# Patient Record
(demographics unavailable — no encounter records)

---

## 2025-02-28 NOTE — PHYSICAL EXAM
[de-identified] : L spine   No rashes, erythema, edema noted TTP right lumbar paraspinals and sciatic notch Positive straight leg raise on the right LLE: 5/5 strength RLE: 5/5 strength Sensation decreased along R L5 dermatome

## 2025-02-28 NOTE — DISCUSSION/SUMMARY
[de-identified] : A discussion regarding available pain management treatment options occurred with the patient. These included interventional, rehabilitative, pharmacological, and alternative modalities. We will proceed with the following:     Imagin. MRI lumbar spine w/o contrast to evaluate for anatomic changes of the lumbar discs, nerves, and surrounding tissue that will help provide information to accurately diagnose the patient's cause of pain and therefore treat said pain generator in the most effective way possible - whether that be specific physical therapy recommendations, medications, and/or interventional therapies.   Medications: 1. Ordered Ibuprofen 800 mg q12h prn (30-day supply, 60 tablets) 2. Ordered Methylprednisolone 21-tablet, 6-day taper pack (30-day supply, 60 tablets)  I advised Ms. Ferris that the NSAID should be taken with food.  In addition, while taking the prescribed NSAID, no over the counter or other NSAIDs should be used, such as ibuprofen (Motrin or Advil) or naproxen (Aleve) as this can cause stomach upset or other side effects.  If needed for fever or breakthrough pain Tylenol can be used.  I advised Ms. Ferris that the steroid should be taken with food.  In addition, while taking the prescribed steroid, no over the counter or other NSAIDs should be used, such as ibuprofen (Motrin or Advil) or naproxen (Aleve) as this can cause stomach upset or other side effects.  If needed for fever or breakthrough pain Tylenol can be used.    Complementary treatment options: - lifestyle modifications discussed - avoid bending and bend with knees - avoid heavy lifting   - Follow up in 2-3 weeks to discuss imaging.   I Johanne Horton attest that this documentation has been prepared under the direction and in the presence of provider Dr. Aniceto Aguiar.  The documentation recorded by the scribe in my presence, accurately reflects the service I personally performed, and the decisions made by me with my edits as appropriate.   Aniceto Aguiar, DO

## 2025-02-28 NOTE — HISTORY OF PRESENT ILLNESS
Detail Level: Zone
[FreeTextEntry1] : Ms. Ferris is a 37-year-old female presenting to establish care for her lower back pain. She has a chronic history of lower back pain which she has undergone a microdiscectomy for. About 1 month after surgery, she ended up re-herniating the disc that she had surgical correction on. Last week, she started to experience similar symptoms. She is a  and is working a lot. Her pain is mainly on the right side of the lower lumbar spine with referred pain into the hip and into the lateral aspect of the leg and calf. She describes the pain as shooting, throbbing stabbing and burning. There is also intermittent numbness and tingling in the right leg. Her pain is present when sitting, standing or walking for prolonged periods of time. Her pain is present daily and is impacting her quality of life. She rates the pain anywhere from a 5 to a 8/10 on the pain scale. She denies any bowel/bladder incontinence, fevers/chills, recent weight loss or any saddle anesthesia. 
Detail Level: Generalized
Detail Level: Simple